# Patient Record
Sex: MALE | Race: WHITE | NOT HISPANIC OR LATINO | URBAN - METROPOLITAN AREA
[De-identification: names, ages, dates, MRNs, and addresses within clinical notes are randomized per-mention and may not be internally consistent; named-entity substitution may affect disease eponyms.]

---

## 2022-09-16 ENCOUNTER — OFFICE VISIT (OUTPATIENT)
Dept: URGENT CARE | Facility: CLINIC | Age: 62
End: 2022-09-16
Payer: COMMERCIAL

## 2022-09-16 VITALS
HEART RATE: 68 BPM | SYSTOLIC BLOOD PRESSURE: 127 MMHG | OXYGEN SATURATION: 98 % | TEMPERATURE: 98.2 F | RESPIRATION RATE: 18 BRPM | DIASTOLIC BLOOD PRESSURE: 77 MMHG

## 2022-09-16 DIAGNOSIS — T14.8XXA BLISTER: Primary | ICD-10-CM

## 2022-09-16 PROCEDURE — G0382 LEV 3 HOSP TYPE B ED VISIT: HCPCS | Performed by: NURSE PRACTITIONER

## 2022-09-16 RX ORDER — PHENOL 1.4 %
600 AEROSOL, SPRAY (ML) MUCOUS MEMBRANE
COMMUNITY

## 2022-09-16 RX ORDER — CELECOXIB 100 MG/1
100 CAPSULE ORAL DAILY
COMMUNITY

## 2022-09-16 RX ORDER — HYDROXYCHLOROQUINE SULFATE 200 MG/1
200 TABLET, FILM COATED ORAL 2 TIMES DAILY WITH MEALS
COMMUNITY

## 2022-09-16 RX ORDER — CANDESARTAN 16 MG/1
16 TABLET ORAL DAILY
COMMUNITY

## 2022-09-16 RX ORDER — IBUPROFEN 200 MG
300 CAPSULE ORAL 2 TIMES DAILY
COMMUNITY

## 2022-09-16 RX ORDER — LEVOTHYROXINE SODIUM 0.2 MG/1
225 TABLET ORAL DAILY
COMMUNITY

## 2022-09-16 RX ORDER — PANTOPRAZOLE SODIUM 40 MG/1
40 TABLET, DELAYED RELEASE ORAL DAILY
COMMUNITY

## 2022-09-16 NOTE — PATIENT INSTRUCTIONS
Apply bacitracin and dressing as directed  If you develop any any pain, redness, swelling, drainage, fever, or any new or concerning symptoms please return or proceed to ER

## 2022-09-16 NOTE — PROGRESS NOTES
[facility]        NAME: Henrry Junior is a 60 y o  male  : 1960    MRN: [id number]  DATE: 2022  TIME: 5:29 PM    Assessment and Plan   Blister [T14  8XXA]  1  Blister       Wounds dressed with bacitracin and non adherent dressing    Patient Instructions     Patient Instructions   Apply bacitracin and dressing as directed  If you develop any any pain, redness, swelling, drainage, fever, or any new or concerning symptoms please return or proceed to ER  Follow up with PCP in 3-5 days  Proceed to  ER if symptoms worsen  Chief Complaint     Chief Complaint   Patient presents with    Blister     Both heels, started yesterday         History of Present Illness       Patient is a 60year old male who presents with a 2 day hx of blisters to bilateral heels  Has been hiking the [place]  Denies any drainage, redness or swelling  Denies any fever, chills or aches  Denies any numbness, tingling or weakness of extremities  Has not tried any otc medication      Review of Systems   Review of Systems   Constitutional: Negative for chills, diaphoresis, fatigue and fever  HENT: Negative  Eyes: Negative for photophobia and visual disturbance  Respiratory: Negative for cough, chest tightness, shortness of breath, wheezing and stridor  Cardiovascular: Negative for chest pain and palpitations  Gastrointestinal: Negative  Musculoskeletal: Negative for arthralgias, back pain, joint swelling, myalgias, neck pain and neck stiffness  Skin: Positive for wound  Negative for rash  Neurological: Negative for dizziness, syncope, weakness, light-headedness, numbness and headaches           Current Medications       Current Outpatient Medications:     calcium carbonate (OS-SMITHA) 600 MG tablet, Take 600 mg by mouth 3 (three) times a day with meals, Disp: , Rfl:     calcium citrate (CALCITRATE) 950 (200 Ca) MG tablet, Take 300 mg by mouth 2 (two) times a day, Disp: , Rfl:    candesartan (ATACAND) 16 mg tablet, Take 16 mg by mouth daily, Disp: , Rfl:     celecoxib (CeleBREX) 100 mg capsule, Take 100 mg by mouth daily, Disp: , Rfl:     hydroxychloroquine (PLAQUENIL) 200 mg tablet, Take 200 mg by mouth 2 (two) times a day with meals, Disp: , Rfl:     levothyroxine 200 mcg tablet, Take 225 mcg by mouth daily, Disp: , Rfl:     pantoprazole (PROTONIX) 40 mg tablet, Take 40 mg by mouth daily, Disp: , Rfl:     Current Allergies     Allergies as of 09/16/2022    (No Known Allergies)            The following portions of the patient's history were reviewed and updated as appropriate: allergies, current medications, past family history, past medical history, past social history, past surgical history and problem list      Past Medical History:   Diagnosis Date    Disease of thyroid gland     Hypertension     Osteoarthritis        History reviewed  No pertinent surgical history  No family history on file  Medications have been verified  Objective   /77   Pulse 68   Temp 98 2 °F (36 8 °C)   Resp 18   SpO2 98%   No LMP for male patient  Physical Exam     Physical Exam  HENT:      Head: Normocephalic and atraumatic  Cardiovascular:      Rate and Rhythm: Normal rate and regular rhythm  Heart sounds: Normal heart sounds, S1 normal and S2 normal    Pulmonary:      Effort: Pulmonary effort is normal       Breath sounds: Normal breath sounds and air entry  Skin:     General: Skin is warm and dry  Capillary Refill: Capillary refill takes less than 2 seconds